# Patient Record
Sex: MALE | Race: BLACK OR AFRICAN AMERICAN | NOT HISPANIC OR LATINO | Employment: UNEMPLOYED | URBAN - METROPOLITAN AREA
[De-identification: names, ages, dates, MRNs, and addresses within clinical notes are randomized per-mention and may not be internally consistent; named-entity substitution may affect disease eponyms.]

---

## 2020-02-06 ENCOUNTER — APPOINTMENT (OUTPATIENT)
Dept: RADIOLOGY | Facility: MEDICAL CENTER | Age: 25
End: 2020-02-06
Attending: EMERGENCY MEDICINE

## 2020-02-06 ENCOUNTER — HOSPITAL ENCOUNTER (EMERGENCY)
Facility: MEDICAL CENTER | Age: 25
End: 2020-02-06
Attending: EMERGENCY MEDICINE

## 2020-02-06 VITALS
HEART RATE: 61 BPM | DIASTOLIC BLOOD PRESSURE: 96 MMHG | RESPIRATION RATE: 18 BRPM | WEIGHT: 178.57 LBS | OXYGEN SATURATION: 99 % | BODY MASS INDEX: 25.56 KG/M2 | SYSTOLIC BLOOD PRESSURE: 136 MMHG | TEMPERATURE: 98.4 F | HEIGHT: 70 IN

## 2020-02-06 DIAGNOSIS — S89.92XA INJURY OF LEFT KNEE, INITIAL ENCOUNTER: ICD-10-CM

## 2020-02-06 PROCEDURE — 73564 X-RAY EXAM KNEE 4 OR MORE: CPT | Mod: LT

## 2020-02-06 PROCEDURE — 99283 EMERGENCY DEPT VISIT LOW MDM: CPT

## 2020-02-06 RX ORDER — IBUPROFEN 600 MG/1
600 TABLET ORAL ONCE
Status: DISCONTINUED | OUTPATIENT
Start: 2020-02-06 | End: 2020-02-06 | Stop reason: HOSPADM

## 2020-02-06 NOTE — ED TRIAGE NOTES
Jerel Martinez  Chief Complaint   Patient presents with   • Knee Pain     left     Pt ambulatory to triage with above complaint.  VSS, no acute distress.  Pt had injury to left knee yesterday when a refrigerator fell onto his knee,  CMS intact.   Pt returned to lobby, educated on triage process, and to inform staff of any changes or concerns.

## 2020-02-06 NOTE — ED NOTES
Patient given DC papers, acxox4, crutch training given, knee imobolizer applied. Cms intact. Instructed to follow up with [facility] asap.

## 2020-02-06 NOTE — ED PROVIDER NOTES
"ED Provider Note      CHIEF COMPLAINT   Chief Complaint   Patient presents with   • Knee Pain     left       HPI   Jerel Martinez is a 25 y.o. male who presents with left knee pain.  Was on the job yesterday.  A refrigerator fell on his knee.  Fell on the front of the knee.  He does not know exactly what happened but he has persistent pain since then.  Worse with movement.  He is noticed some swelling.  Throbbing character.  Nonradiating.  Denies other injuries.  No lacerations.  No weakness or numbness.    REVIEW OF SYSTEMS   Pertinent negative: As above    PAST MEDICAL HISTORY   History reviewed. No pertinent past medical history.    SOCIAL HISTORY  Social History     Tobacco Use   • Smoking status: Current Every Day Smoker     Packs/day: 0.00     Types: Cigarettes   • Smokeless tobacco: Never Used   Substance Use Topics   • Alcohol use: Not Currently   • Drug use: Yes     Types: Inhaled       ALLERGIES   See chart    PHYSICAL EXAM  VITAL SIGNS: /96   Pulse 61   Temp 36.9 °C (98.4 °F) (Temporal)   Resp 18   Ht 1.778 m (5' 10\")   Wt 81 kg (178 lb 9.2 oz)   SpO2 99%   BMI 25.62 kg/m²   Head: Atraumatic  Eyes: Eyes normal inspection  Neck: has full range of motion, normal inspection.  Constitutional: No acute distress   Cardiovascular: Normal heart rate. Pulses strong dorsalis pedis  Thorax & Lungs: No respiratory distress  Skin: Intact  Musculoskeletal: There is a left knee effusion.  There is minor pain with range of motion.  There is no gross ligamentous instability on examination although he is resistant to during the examination.  No crepitus.  Compartments soft.  Neurologic:  Normal sensory and motor    RADIOLOGY/PROCEDURES  DX-KNEE COMPLETE 4+ LEFT   Final Result      Large suprapatellar joint effusion without underlying acute osseous abnormality.         Imaging is interpreted by radiologist reviewed by me    COURSE & MEDICAL DECISION MAKING  Analgesia for possible " fracture-ibuprofen    Patient presents with left knee pain after trauma.  Obtained x-ray.  This is negative.  He has a significant joint effusion and has sustained internal derangement.  He will be placed in knee immobilizer and on crutches.  Advised rest, ice light range of motion exercises without weightbearing.  He will need to follow-up at the occupational health clinic for further evaluation and consideration of orthopedics referral depending on findings.  I discussed this with him.  His C4 is completed.  Advised Tylenol as needed for pain.      FINAL IMPRESSION  1.  Internal derangement, left knee      This dictation was created using voice recognition software. The accuracy of the dictation is limited to the abilities of the software. I expect there may be some errors of grammar and possibly content. The nursing notes were reviewed and certain aspects of this information were incorporated into this note.      Electronically signed by: Jose J Kelly M.D., 2/6/2020 7:35 AM

## 2020-02-06 NOTE — LETTER
"  FORM C-4:  EMPLOYEE’S CLAIM FOR COMPENSATION/ REPORT OF INITIAL TREATMENT  EMPLOYEE’S CLAIM - PROVIDE ALL INFORMATION REQUESTED   First Name Jerel Last Name Juan Birthdate 1995  Sex male Claim Number   Home Employee Address [address]    [facility]                                     Zip  [address] Height  1.778 m (5' 10\") Weight  81 kg (178 lb 9.2 oz) N  [phone]   Mailing Employee Address [address]     [facility]               Zip  [address] Telephone  [phone] (home)  Primary Language Spoken   Insurer   Third Party   TRIP PRESTON 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 (if applicable) MOVING A REFRIGERATOR WITH DOLLEY    How did this injury or occupational disease occur? Be specific and answer in detail. Use additional sheet if necessary)  MOVING A REFRIGERATOR & IT FELL ON MY KNEE   If you believe that you have an occupational disease, when did you first have knowledge of the disability and it relationship to your employment? NA Witnesses to the Accident  JULIETA BALTAZAR    Nature of Injury or Occupational Disease  Workers' Compensation Part(s) of Body Injured or Affected  Knee (L), N/A, N/A    I CERTIFY THAT THE ABOVE IS TRUE AND CORRECT TO THE BEST OF MY KNOWLEDGE AND THAT I HAVE PROVIDED THIS INFORMATION IN ORDER TO OBTAIN THE BENEFITS OF [place] " OR [place]).  I HEREBY AUTHORIZE ANY PHYSICIAN, CHIROPRACTOR, SURGEON, PRACTITIONER, OR OTHER PERSON, ANY HOSPITAL, INCLUDING [facility] OR [facility], ANY MEDICAL SERVICE ORGANIZATION, ANY INSURANCE COMPANY, OR OTHER INSTITUTION OR ORGANIZATION TO RELEASE TO EACH OTHER, ANY MEDICAL OR OTHER INFORMATION, INCLUDING BENEFITS PAID OR PAYABLE, PERTINENT TO THIS INJURY OR DISEASE, EXCEPT INFORMATION RELATIVE TO DIAGNOSIS, TREATMENT AND/OR COUNSELING FOR AIDS, PSYCHOLOGICAL CONDITIONS, ALCOHOL OR CONTROLLED SUBSTANCES, FOR WHICH I MUST GIVE SPECIFIC AUTHORIZATION.  A PHOTOSTAT OF THIS AUTHORIZATION SHALL BE AS VALID AS THE ORIGINAL.  Date                                      Place      [facility]                                                                       Employee’s Signature   THIS REPORT MUST BE COMPLETED AND MAILED WITHIN 3 WORKING DAYS OF TREATMENT   Place [facility], EMERGENCY DEPT                                                                             Name of Facility [facility]   Date  2/6/2020 Diagnosis  (S89.92XA) Injury of left knee, initial encounter Is there evidence the injured employee was under the influence of alcohol and/or another controlled substance at the time of accident?   Hour  8:46 AM Description of Injury or Disease  Injury of left knee, initial encounter No   Treatment  Crutches, immobilizer  Have you advised the patient to remain off work five days or more?         No   X-Ray Findings  Negative If Yes   From Date    To Date      From information given by the employee, together with medical evidence, can you directly connect this injury or occupational disease as job incurred? Yes If No, is employee capable of: Full Duty  No Modified Duty  Yes   Is additional medical care by a physician indicated? Yes If Modified Duty, Specify any Limitations / Restrictions   No use of left knee until cleared by occupational health    "  Do you know of any previous injury or disease contributing to this condition or occupational disease? No    Date 2/6/2020 Print Doctor’s Name Aleida Kelly certify the employer’s copy of this form was mailed on:   Address [address] [place] [address]  [place]I [id number] [phone] INSURER’S USE ONLY   Provider’s Tax ID Number [id number] Telephone Dept: [phone]    Doctor’s Signature e-ALEIDA Sutton M.D. Degree  MD      Form C-4 (rev.10/07)                                                                         BRIEF DESCRIPTION OF RIGHTS AND BENEFITS  (Pursuant to [place] 616[place])    Notice of Injury or Occupational Disease (Incident Report Form C-1): If an injury or occupational disease (OD) arises out of and in the course of employment, you must provide written notice to your employer as soon as practicable, but no later than 7 days after the accident or OD. Your employer shall maintain a sufficient supply of the required forms.    Claim for Compensation (Form C-4): If medical treatment is sought, the form C-4 is available at the place of initial treatment. A completed \"Claim for Compensation\" (Form C-4) must be filed within 90 days after an accident or OD. The treating physician or chiropractor must, within 3 working days after treatment, complete and mail to the employer, the employer's insurer and third-party , the Claim for Compensation.    Medical Treatment: If you require medical treatment for your on-the-job injury or OD, you may be required to select a physician or chiropractor from a list provided by your workers’ compensation insurer, if it has contracted with an Organization for Managed Care (MCO) or Preferred Provider Organization (PPO) or providers of health care. If your employer has not entered into a contract with an MCO or PPO, you may select a physician or chiropractor from the Panel of Physicians and Chiropractors. Any medical costs related to your industrial injury or OD will " be paid by your insurer.    Temporary Total Disability (TTD): If your doctor has certified that you are unable to work for a period of at least 5 consecutive days, or 5 cumulative days in a 20-day period, or places restrictions on you that your employer does not accommodate, you may be entitled to TTD compensation.    Temporary Partial Disability (TPD): If the wage you receive upon reemployment is less than the compensation for TTD to which you are entitled, the insurer may be required to pay you TPD compensation to make up the difference. TPD can only be paid for a maximum of 24 months.    Permanent Partial Disability (PPD): When your medical condition is stable and there is an indication of a PPD as a result of your injury or OD, within 30 days, your insurer must arrange for an evaluation by a rating physician or chiropractor to determine the degree of your PPD. The amount of your PPD award depends on the date of injury, the results of the PPD evaluation and your age and wage.    Permanent Total Disability (PTD): If you are medically certified by a treating physician or chiropractor as permanently and totally disabled and have been granted a PTD status by your insurer, you are entitled to receive monthly benefits not to exceed 66 2/3% of your average monthly wage. The amount of your PTD payments is subject to reduction if you previously received a PPD award.    Vocational Rehabilitation Services: You may be eligible for vocational rehabilitation services if you are unable to return to the job due to a permanent physical impairment or permanent restrictions as a result of your injury or occupational disease.    Transportation and Per Kelvin Reimbursement: You may be eligible for travel expenses and per kelvin associated with medical treatment.    Reopening: You may be able to reopen your claim if your condition worsens after claim closure.     Appeal Process: If you disagree with a written determination issued by the  insurer or the insurer does not respond to your request, you may appeal to the Department of Administration, , by following the instructions contained in your determination letter. You must appeal the determination within 70 days from the date of the determination letter at [address], [address], [place], [place] [address], or [address], [address], [place], [place] [address]. If you disagree with the  decision, you may appeal to the Department of Administration, . You must file your appeal within 30 days from the date of the  decision letter at [address], [address], [place], [place] [address], or [address], [address], [place], [place] [address]. If you disagree with a decision of an , you may file a petition for judicial review with the District Court. You must do so within 30 days of the Appeal Officer’s decision. You may be represented by an  at your own expense or you may contact the [facility] for possible representation.    [place]  for Injured Workers (NAIW): If you disagree with a  decision, you may request that NAIW represent you without charge at an  Hearing. For information regarding denial of benefits, you may contact the [facility] at: [address], [address], [place] [address], [phone], or [address], [place] [address], [phone]    To File a Complaint with the Division: If you wish to file a complaint with the  of the Division of Industrial Relations (DIR),  please contact the Workers’ Compensation Section, [address], [place], [place] [address], telephone [phone], or [address], [place], [place] [address], telephone [phone].    For assistance with Workers’ Compensation Issues: You may contact the Office of the Governor Consumer Health  Assistance, [address], [place], [place] [address], Toll Free 1-281.728.8664, Web site: [email or web address]., E-mail [email or web address].  D-2 (rev. 06/18)              __________________________________________________________________                                    _________________            Employee Name / Signature                                                                                                                            Date

## 2020-03-01 ENCOUNTER — HOSPITAL ENCOUNTER (EMERGENCY)
Facility: MEDICAL CENTER | Age: 25
End: 2020-03-01
Attending: EMERGENCY MEDICINE
Payer: COMMERCIAL

## 2020-03-01 VITALS
SYSTOLIC BLOOD PRESSURE: 129 MMHG | TEMPERATURE: 97.4 F | HEART RATE: 85 BPM | HEIGHT: 69 IN | BODY MASS INDEX: 25.14 KG/M2 | OXYGEN SATURATION: 99 % | RESPIRATION RATE: 16 BRPM | WEIGHT: 169.75 LBS | DIASTOLIC BLOOD PRESSURE: 76 MMHG

## 2020-03-01 DIAGNOSIS — S83.92XA SPRAIN OF LEFT KNEE, UNSPECIFIED LIGAMENT, INITIAL ENCOUNTER: ICD-10-CM

## 2020-03-01 DIAGNOSIS — M25.462 EFFUSION OF BURSA OF LEFT KNEE: ICD-10-CM

## 2020-03-01 PROCEDURE — 99282 EMERGENCY DEPT VISIT SF MDM: CPT

## 2020-03-01 PROCEDURE — 99281 EMR DPT VST MAYX REQ PHY/QHP: CPT

## 2020-03-01 NOTE — LETTER
[facility], EMERGENCY DEPT   [address], [place] [address]  Phone: Dept: [phone] - Fax:        [facility] Progress Report and Disability Certification  Date of Service: 3/1/2020   No Show:  No  Date / Time of Next Visit:     Claim Information   Patient Name: Jerel Martinez  Claim Number:     Employer:    Date of Injury: 2/5/2020     Insurer / TPA:   ID / SSN: [id number]    Occupation: Contracter Diagnosis: Diagnoses of Sprain of left knee, unspecified ligament, initial encounter and Effusion of bursa of left knee were pertinent to this visit.    Medical Information   Related to Industrial Injury?   ***   Subjective Complaints:  Left knee pain and swelling   Objective Findings: Left knee effusion   Pre-Existing Condition(s): Unknown   Assessment:        Status: Additional Care Required  Permanent Disability:     Plan: Medication  Comments:NSAIDs, rest/ice/elevation, range of motion exercises, follow-up with occupational health/primary care/orthopedics for evaluation and MRI if indicated    Diagnostics:      Comments:       Disability Information   Status:      From:     Through:   Restrictions are:     Physical Restrictions   Sitting:    Standing:    Stooping:    Bending:      Squatting:    Walking:    Climbing:    Pushing:      Pulling:    Other:    Reaching Above Shoulder (L):   Reaching Above Shoulder (R):       Reaching Below Shoulder (L):    Reaching Below Shoulder (R):      Not to exceed Weight Limits   Carrying(hrs):   Weight Limit(lb):   Lifting(hrs):   Weight  Limit(lb):     Comments:      Repetitive Actions   Hands: i.e. Fine Manipulations from Grasping:     Feet: i.e. Operating Foot Controls:     Driving / Operate Machinery:     Physician Name: Joselyn Keith Physician Signature: JOSELYN Duval D.O. e-Signature:  , Medical Director   Clinic Name / Location: [facility]  CENTER, EMERGENCY DEPT  [address] [facility] [address]  [phone]     Clinic Phone Number: Dept: [phone]   Appointment Time:  Visit Start Time:    Check-In Time:  11:07 AM Visit Discharge Time:    Original-Treating Physician or Chiropractor    Page 2-Insurer/TPA    Page 3-Employer    Page 4-Employee

## 2020-03-01 NOTE — ED PROVIDER NOTES
"ED Provider Note    CHIEF COMPLAINT  Chief Complaint   Patient presents with   • Knee Swelling     left knee. Injury several weeks ago but contains of continued swelling.       HPI  Jerel Martinez is a 25 y.o. male who presents to the emergency department through triage with family for left knee swelling.  Patient states he had a work injury a couple of weeks ago.  Patient states he slipped while moving a refrigerator, fell onto the right knee, as a refrigerator tilted/fell onto his left knee.  Patient was seen thereafter for x-rays which were normal.  He is continued to have some swelling around the knee.  Describes increased swelling with activity.  Some discomfort, not taking medication or anti-inflammatories.    Patient was hoping for MRI or \"just drain the fluid off of my knee,\" as he has been unable to follow-up due to insurance reasons.    Denies new injury.  Denies paresthesias.  Denies discoloration.    REVIEW OF SYSTEMS  See HPI for further details. All other systems are negative.     PAST MEDICAL HISTORY   Denies    SOCIAL HISTORY  Social History     Tobacco Use   • Smoking status: Current Every Day Smoker     Packs/day: 0.00     Types: Cigarettes   • Smokeless tobacco: Never Used   Substance and Sexual Activity   • Alcohol use: Not Currently   • Drug use: Yes     Types: Inhaled   • Sexual activity: Not on file       SURGICAL HISTORY  patient denies any surgical history    CURRENT MEDICATIONS  Home Medications    **Home medications have not yet been reviewed for this encounter**     Denies    ALLERGIES  No Known Allergies    PHYSICAL EXAM  VITAL SIGNS: /76   Pulse 85   Temp 36.3 °C (97.4 °F) (Temporal)   Resp 16   Ht 1.753 m (5' 9\")   Wt 77 kg (169 lb 12.1 oz)   SpO2 99%   BMI 25.07 kg/m²   Pulse ox interpretation: I interpret this pulse ox as normal.  Constitutional: Alert in no apparent distress.  HENT: Normocephalic, atraumatic. Bilateral external ears normal, Nose normal.   Eyes: " "Conjunctiva normal.   Neck: Normal range of motion  Cardiovascular: Normal peripheral perfusion.  Thorax & Lungs: Nonlabored respirations.  Skin: Warm, Dry  Musculoskeletal: Mild visualized and palpable left knee effusion.  Mild discomfort with full flexion only.  Bears weight and ambulates independently.  No crepitus, deformity.  Mild tenderness bilateral joint line.  No discomfort with palpation over patella.  No discomfort at distal femur.  No thigh or calf swelling, discoloration.  2+ DP.  Sensation intact light touch.  Neurologic: Alert and oriented x4.  Psychiatric: Affect normal, Judgment normal, Mood normal.       COURSE & MEDICAL DECISION MAKING  Medical record review: Patient seen at this facility 2/6/2020 for left knee pain.  X-ray with \"large suprapatellar joint effusion without underlying acute osseous abnormality.\"  He was placed in a knee immobilizer and on crutches.  Advised rest, ice, light range of motion exercises without weightbearing.  He will need to follow-up with occupational health or orthopedics.  C4 completed.    ED evaluation redemonstrates left knee effusion, suspect severe sprain.  Cannot exclude internal derangement.  Patient will need orthopedic evaluation and/or MRI imaging although there is no indication to do so emergently.  No evidence for septic arthritis or bursitis.  No clinical evidence for DVT.  CMS intact distally.  Pain is been controlled without medications although anti-inflammatories are encouraged.  D 39 completed although patient states as he was an , he is not covered by Workmen's Comp.    Patient is stable for discharge at this time, anticipatory guidance provided, NSAIDs encouraged, close follow-up is encouraged, and strict ED return instructions have been detailed. Patient is agreeable to the disposition and plan.    Patient's blood pressure was elevated in the emergency department, and has been referred to primary care for close " monitoring.      FINAL IMPRESSION  (S83.92XA) Sprain of left knee, unspecified ligament, initial encounter  (M25.462) Effusion of bursa of left knee      Electronically signed by: Joselyn Keith D.O., 3/1/2020 12:10 PM      This dictation was created using voice recognition software. The accuracy of the dictation is limited to the abilities of the software. I expect there may be some errors of grammar and possibly content. The nursing notes were reviewed and certain aspects of this information were incorporated into this note.

## 2020-03-01 NOTE — DISCHARGE INSTRUCTIONS
Follow-up with primary care or orthopedics this week for reevaluation and additional work-up/imaging/MRI as indicated for knee pain and swelling.    Rest, ice, elevation as needed for swelling or discomfort.    Motrin 600 mg every 6 hours, or naproxen 500 mg twice daily as needed for pain or swelling.    Neoprene sleeve or Ace wrap as needed for swelling, discomfort while weightbearing.    Range of motion exercises encouraged.  Weightbearing as tolerated.    Return to the emergency department for increased pain, swelling, discoloration, paresthesias or other new concerns.

## 2020-03-01 NOTE — ED TRIAGE NOTES
Chief Complaint   Patient presents with   • Knee Swelling     left knee. Injury several weeks ago but contains of continued swelling.     Ambulatory to triage for above. Explained triage process, to waiting room. Asked to inform RN if questions or concerns arise.